# Patient Record
Sex: MALE | Race: BLACK OR AFRICAN AMERICAN | NOT HISPANIC OR LATINO | URBAN - NONMETROPOLITAN AREA
[De-identification: names, ages, dates, MRNs, and addresses within clinical notes are randomized per-mention and may not be internally consistent; named-entity substitution may affect disease eponyms.]

---

## 2019-10-31 ENCOUNTER — IMPORTED ENCOUNTER (OUTPATIENT)
Dept: URBAN - NONMETROPOLITAN AREA CLINIC 1 | Facility: CLINIC | Age: 54
End: 2019-10-31

## 2019-10-31 PROBLEM — H52.223: Noted: 2019-10-31

## 2019-10-31 PROBLEM — H52.4: Noted: 2019-10-31

## 2019-10-31 PROBLEM — H52.01: Noted: 2019-10-31

## 2019-10-31 PROBLEM — H25.813: Noted: 2019-10-31

## 2019-10-31 PROBLEM — H35.363: Noted: 2019-10-31

## 2019-10-31 PROCEDURE — S0620 ROUTINE OPHTHALMOLOGICAL EXA: HCPCS

## 2019-10-31 NOTE — PATIENT DISCUSSION
Cataract OU-Reviewed symptoms of advancing cataract growth such as glare and halos and decreased vision.-Continue to monitor for now. Pt will notify us if any new symptoms develop. Drusen OU -Discussed findings of exam in detail with the patient.-Discussed the chronic nature of this disease and limited treatment options. -Areds 2 formulation. Hyperopia-Discussed diagnosis with patient. Astigmatism-Discussed diagnosis with patient. Presbyopia-Discussed diagnosis with patient. Updated spec Rx given. Recommend lens that will provide comfort as well as protect safety and health of eyes.

## 2020-11-16 NOTE — PATIENT DISCUSSION
Likely bacterial. Polytrim OD QID 1 WEEK. Transition of Care Plan: * Disposition- Home to [address], [address] * [facility] to open for service 11/17 * [facility] transport scheduled for 10 AM 
* 2nd IM received 11/16 Care conference was held this AM between patient/ sont and the Mrs. Hermosilloardo Phill, JOSE DE JESUS. Patient and her children Sade Mendez and Thea Ramirez have elected for patient to return to her home with family support and Hospice. [place] of choice provided and referral placed to [facility] per patient? children request. [facility] will have home oxygen delivered to the house today. Per Mr. Nayely Hernandez the family has already purchased a hospital bed. Dialysis is planned for today with anticipated discharge tomorrow. Medicare pt has received, reviewed, and signed 2nd IM letter informing them of their right to appeal the discharge. Signed copied has been placed on pt bedside chart. Care Management Interventions PCP Verified by CM: Yes Mode of Transport at Discharge: Other (see comment) Transition of Care Consult (CM Consult): Home Hospice [facility]: No 
Reason Outside [place]: Patient already serviced by other home care/hospice agency Discharge Durable Medical Equipment: No 
Physical Therapy Consult: Yes Occupational Therapy Consult: Yes Speech Therapy Consult: No 
Current Support Network: Lives Alone, Own Home Confirm Follow Up Transport: Family The Plan for Transition of Care is Related to the Following Treatment Goals : Home with [facility] The Patient and/or Patient Representative was Provided with a Choice of Provider and Agrees with the Discharge Plan?: Yes Name of the Patient Representative Who was Provided with a Choice of Provider and Agrees with the Discharge Plan: Patient/ MrTyrone Mendez [place] of Choice List was Provided with Basic Dialogue that Supports the Patient's Individualized Plan of Care/Goals, Treatment Preferences and Shares the Quality Data Associated with the Providers?: Yes [employer] Information Provided?: No 
Discharge Location Discharge Placement: Home with hospice Lisa Kohli LCSW, [facility] Care Coordinator/Raman C: [phone]

## 2021-04-12 NOTE — PATIENT DISCUSSION
Cataract symptoms i.e., glare, blur discussed. Pt to call if worsening vision or trouble with driving, TV, reading, ADL. UV precautions. Reviewed possibility of future cataract surgery. +/- ISTENTS.

## 2021-04-12 NOTE — PATIENT DISCUSSION
Cataract symptoms i.e., glare, blur discussed. Pt to call if worsening vision or trouble with driving, TV, reading, ADL. UV precautions. Reviewed possibility of future cataract surgery. +/= ISTENTS.

## 2021-05-26 ENCOUNTER — IMPORTED ENCOUNTER (OUTPATIENT)
Dept: URBAN - NONMETROPOLITAN AREA CLINIC 1 | Facility: CLINIC | Age: 56
End: 2021-05-26

## 2021-05-26 PROCEDURE — 92014 COMPRE OPH EXAM EST PT 1/>: CPT

## 2021-05-26 PROCEDURE — 92015 DETERMINE REFRACTIVE STATE: CPT

## 2022-04-09 ASSESSMENT — VISUAL ACUITY
OS_CC: 20/30+3
OU_CC: 20/20-1
OD_SC: 20/70
OU_SC: 20/70
OD_CC: 20/30+
OS_SC: 20/70
OS_CC: 20/25
OD_CC: 20/30-1

## 2022-04-09 ASSESSMENT — TONOMETRY
OS_IOP_MMHG: 16
OD_IOP_MMHG: 16
OD_IOP_MMHG: 16
OS_IOP_MMHG: 16